# Patient Record
Sex: MALE | Race: WHITE | NOT HISPANIC OR LATINO | Employment: OTHER | URBAN - METROPOLITAN AREA
[De-identification: names, ages, dates, MRNs, and addresses within clinical notes are randomized per-mention and may not be internally consistent; named-entity substitution may affect disease eponyms.]

---

## 2023-07-19 LAB
6-ACETYLMORPHINE: <25 NG/ML
7-AMINOCLONAZEPAM: <25 NG/ML
ALPHA-HYDROXYALPRAZOLAM: <25 NG/ML
ALPHA-HYDROXYMIDAZOLAM: <25 NG/ML
ALPRAZOLAM: <25 NG/ML
AMPHETAMINE (PRESENCE) IN URINE BY SCREEN METHOD: ABNORMAL
BARBITURATES PRESENCE IN URINE BY SCREEN METHOD: ABNORMAL
CANNABINOIDS IN URINE BY SCREEN METHOD: ABNORMAL
CHLORDIAZEPOXIDE: <25 NG/ML
CLONAZEPAM: <25 NG/ML
COCAINE (PRESENCE) IN URINE BY SCREEN METHOD: ABNORMAL
CODEINE: <50 NG/ML
CREATINE, URINE FOR DRUG: 87.3 MG/DL
DIAZEPAM: <25 NG/ML
DRUG SCREEN COMMENT URINE: ABNORMAL
EDDP: <25 NG/ML
FENTANYL CONFIRMATION, URINE: <2.5 NG/ML
HYDROCODONE: 1079 NG/ML
HYDROMORPHONE: 584 NG/ML
LORAZEPAM: <25 NG/ML
METHADONE CONFIRMATION,URINE: <25 NG/ML
MIDAZOLAM: <25 NG/ML
MORPHINE URINE: <50 NG/ML
NORDIAZEPAM: <25 NG/ML
NORFENTANYL: <2.5 NG/ML
NORHYDROCODONE: >1000 NG/ML
NOROXYCODONE: <25 NG/ML
O-DESMETHYLTRAMADOL: <50 NG/ML
OXAZEPAM: <25 NG/ML
OXYCODONE: <25 NG/ML
OXYMORPHONE: <25 NG/ML
PHENCYCLIDINE (PRESENCE) IN URINE BY SCREEN METHOD: ABNORMAL
TEMAZEPAM: <25 NG/ML
TRAMADOL: <50 NG/ML
ZOLPIDEM METABOLITE (ZCA): <25 NG/ML
ZOLPIDEM: <25 NG/ML

## 2023-09-01 PROBLEM — D3A.098: Status: ACTIVE | Noted: 2023-09-01

## 2023-09-01 PROBLEM — R63.4 WEIGHT LOSS: Status: ACTIVE | Noted: 2023-09-01

## 2023-09-01 PROBLEM — R10.12 LEFT UPPER QUADRANT PAIN: Status: ACTIVE | Noted: 2023-09-01

## 2023-09-01 PROBLEM — R55 NEAR SYNCOPE: Status: ACTIVE | Noted: 2023-09-01

## 2023-09-01 PROBLEM — G89.29 CHRONIC RIGHT-SIDED LOW BACK PAIN WITHOUT SCIATICA: Status: ACTIVE | Noted: 2023-09-01

## 2023-09-01 PROBLEM — I48.91 AFIB (MULTI): Status: ACTIVE | Noted: 2023-09-01

## 2023-09-01 PROBLEM — E66.3 OVERWEIGHT (BMI 25.0-29.9): Status: ACTIVE | Noted: 2023-09-01

## 2023-09-01 PROBLEM — R42 DIZZY SPELLS: Status: ACTIVE | Noted: 2023-09-01

## 2023-09-01 PROBLEM — M79.18 RIGHT BUTTOCK PAIN: Status: ACTIVE | Noted: 2023-09-01

## 2023-09-01 PROBLEM — E78.5 HYPERLIPIDEMIA: Status: ACTIVE | Noted: 2023-09-01

## 2023-09-01 PROBLEM — R51.9 GENERALIZED HEADACHES: Status: ACTIVE | Noted: 2023-09-01

## 2023-09-01 PROBLEM — M54.50 CHRONIC RIGHT-SIDED LOW BACK PAIN WITHOUT SCIATICA: Status: ACTIVE | Noted: 2023-09-01

## 2023-09-01 PROBLEM — M70.61 GREATER TROCHANTERIC BURSITIS OF RIGHT HIP: Status: ACTIVE | Noted: 2023-09-01

## 2023-09-01 PROBLEM — R00.1 SYMPTOMATIC BRADYCARDIA: Status: ACTIVE | Noted: 2023-09-01

## 2023-09-01 PROBLEM — R13.10 DIFFICULTY SWALLOWING: Status: ACTIVE | Noted: 2023-09-01

## 2023-09-01 RX ORDER — HYDROCODONE BITARTRATE AND ACETAMINOPHEN 7.5; 325 MG/1; MG/1
1 TABLET ORAL EVERY 8 HOURS PRN
COMMUNITY
Start: 2022-07-14 | End: 2023-10-09 | Stop reason: SDUPTHER

## 2023-09-01 RX ORDER — OXYCODONE AND ACETAMINOPHEN 5; 325 MG/1; MG/1
1 TABLET ORAL EVERY 4 HOURS
COMMUNITY

## 2023-09-01 RX ORDER — AMLODIPINE BESYLATE 5 MG/1
TABLET ORAL
COMMUNITY
Start: 2020-10-21

## 2023-09-01 RX ORDER — AZELAIC ACID 0.15 G/G
1 GEL TOPICAL 2 TIMES DAILY
COMMUNITY
Start: 2021-06-11

## 2023-09-01 RX ORDER — FLUTICASONE PROPIONATE 50 MCG
2 SPRAY, SUSPENSION (ML) NASAL DAILY
COMMUNITY
Start: 2017-02-18

## 2023-09-01 RX ORDER — SILDENAFIL 100 MG/1
100 TABLET, FILM COATED ORAL AS NEEDED
COMMUNITY

## 2023-09-01 RX ORDER — POTASSIUM CITRATE 10 MEQ/1
TABLET, EXTENDED RELEASE ORAL
COMMUNITY
Start: 2016-06-10

## 2023-09-01 RX ORDER — MV-MIN/VIT C/GLUT/LYSINE/HB124 1000-50 MG
TABLET, EFFERVESCENT ORAL
COMMUNITY

## 2023-09-01 RX ORDER — LIDOCAINE 50 MG/G
1 PATCH TOPICAL DAILY
COMMUNITY
Start: 2020-07-22 | End: 2023-12-07

## 2023-09-01 RX ORDER — NALOXONE HYDROCHLORIDE 4 MG/.1ML
4 SPRAY NASAL AS NEEDED
COMMUNITY
Start: 2020-12-16

## 2023-09-01 RX ORDER — CLOTRIMAZOLE 1 %
CREAM (GRAM) TOPICAL
COMMUNITY
Start: 2021-06-11

## 2023-09-01 RX ORDER — AMMONIUM LACTATE 12 G/100G
LOTION TOPICAL
COMMUNITY
Start: 2017-09-08

## 2023-10-09 ENCOUNTER — OFFICE VISIT (OUTPATIENT)
Dept: PAIN MEDICINE | Facility: CLINIC | Age: 68
End: 2023-10-09
Payer: COMMERCIAL

## 2023-10-09 VITALS
DIASTOLIC BLOOD PRESSURE: 84 MMHG | SYSTOLIC BLOOD PRESSURE: 155 MMHG | WEIGHT: 215 LBS | BODY MASS INDEX: 32.58 KG/M2 | HEART RATE: 62 BPM | HEIGHT: 68 IN | TEMPERATURE: 97 F

## 2023-10-09 DIAGNOSIS — M54.50 CHRONIC RIGHT-SIDED LOW BACK PAIN WITHOUT SCIATICA: ICD-10-CM

## 2023-10-09 DIAGNOSIS — D3A.098 CARCINOID TUMOR OF ABDOMEN (CMS-HCC): Primary | ICD-10-CM

## 2023-10-09 DIAGNOSIS — G89.29 CHRONIC RIGHT-SIDED LOW BACK PAIN WITHOUT SCIATICA: ICD-10-CM

## 2023-10-09 PROCEDURE — 1159F MED LIST DOCD IN RCRD: CPT | Performed by: ANESTHESIOLOGY

## 2023-10-09 PROCEDURE — 99213 OFFICE O/P EST LOW 20 MIN: CPT | Performed by: ANESTHESIOLOGY

## 2023-10-09 PROCEDURE — 1160F RVW MEDS BY RX/DR IN RCRD: CPT | Performed by: ANESTHESIOLOGY

## 2023-10-09 PROCEDURE — 1125F AMNT PAIN NOTED PAIN PRSNT: CPT | Performed by: ANESTHESIOLOGY

## 2023-10-09 RX ORDER — HYDROCODONE BITARTRATE AND ACETAMINOPHEN 7.5; 325 MG/1; MG/1
1 TABLET ORAL EVERY 8 HOURS PRN
Qty: 90 TABLET | Refills: 0 | Status: SHIPPED
Start: 2023-10-13 | End: 2023-10-11 | Stop reason: SDUPTHER

## 2023-10-09 ASSESSMENT — ENCOUNTER SYMPTOMS
BACK PAIN: 1
LOSS OF SENSATION IN FEET: 0
DEPRESSION: 0
SHORTNESS OF BREATH: 0
OCCASIONAL FEELINGS OF UNSTEADINESS: 0
FEVER: 0

## 2023-10-09 ASSESSMENT — PAIN - FUNCTIONAL ASSESSMENT: PAIN_FUNCTIONAL_ASSESSMENT: 0-10

## 2023-10-09 ASSESSMENT — PAIN SCALES - GENERAL
PAINLEVEL: 9
PAINLEVEL_OUTOF10: 9

## 2023-10-09 ASSESSMENT — COLUMBIA-SUICIDE SEVERITY RATING SCALE - C-SSRS
6. HAVE YOU EVER DONE ANYTHING, STARTED TO DO ANYTHING, OR PREPARED TO DO ANYTHING TO END YOUR LIFE?: NO
1. IN THE PAST MONTH, HAVE YOU WISHED YOU WERE DEAD OR WISHED YOU COULD GO TO SLEEP AND NOT WAKE UP?: NO

## 2023-10-09 ASSESSMENT — PATIENT HEALTH QUESTIONNAIRE - PHQ9
2. FEELING DOWN, DEPRESSED OR HOPELESS: NOT AT ALL
SUM OF ALL RESPONSES TO PHQ9 QUESTIONS 1 AND 2: 0
1. LITTLE INTEREST OR PLEASURE IN DOING THINGS: NOT AT ALL

## 2023-10-09 ASSESSMENT — LIFESTYLE VARIABLES: TOTAL SCORE: 0

## 2023-10-09 ASSESSMENT — PAIN DESCRIPTION - DESCRIPTORS: DESCRIPTORS: ACHING

## 2023-10-09 NOTE — PROGRESS NOTES
Chief Complain  Chronic low back pain worse on the right side    History Of Present Illness  Indra Zimmerman is a 68 y.o. male here for right-sided chronic low back pain radiating to buttocks. The patient rates the pain at 9 on a scale from 0-10.  The patient describes pain as aching.  The pain is worsened by walking and is alleviated by medications opioid analgesics.  Since the last visit the pain has  been stable .  The patient denies any fever, chills, weight loss, bladder/bowel incontinence.       Past Medical History  He has a past medical history of Hordeolum externum unspecified eye, unspecified eyelid, Male erectile dysfunction, unspecified, Other conditions influencing health status, Personal history of malignant neoplasm of liver, Personal history of other diseases of the digestive system, Personal history of other endocrine, nutritional and metabolic disease, Personal history of other infectious and parasitic diseases, Personal history of other malignant neoplasm of kidney, Personal history of other mental and behavioral disorders, Personal history of other specified conditions, and Unspecified visual disturbance.    Surgical History  He has a past surgical history that includes Other surgical history (12/03/2014); Other surgical history (12/03/2014); and Other surgical history (08/03/2020).     Social History  He has no history on file for tobacco use, alcohol use, and drug use.    Family History  Family History   Problem Relation Name Age of Onset    Diabetes Mother      Other (cardiac disorder) Father      Diabetes Father      Diabetes Sister      Diabetes Brother          Allergies  Penicillins    Review of Systems  Review of Systems   Constitutional:  Negative for fever.   Respiratory:  Negative for shortness of breath.    Cardiovascular:  Negative for chest pain.   Musculoskeletal:  Positive for back pain.   Psychiatric/Behavioral:  Negative for suicidal ideas.         Physical Exam  Physical Exam  HENT:  "     Head: Normocephalic.   Eyes:      Pupils: Pupils are equal, round, and reactive to light.   Pulmonary:      Effort: Pulmonary effort is normal.   Neurological:      Mental Status: He is alert and oriented to person, place, and time.   Psychiatric:         Mood and Affect: Mood normal.           Last Recorded Vitals  Blood pressure 155/84, pulse 62, temperature 36.1 °C (97 °F), height 1.727 m (5' 8\"), weight 97.5 kg (215 lb).         Assessment/Plan     Indra Zimmerman is a 68 y.o. male here for follow-up of chronic low back pain.  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  These findings are likely is possible for his pain.  Currently managing his pain with chronic opiate therapy taking hydrocodone 7.5 mg every 8 hours as needed. He is getting 50-60% pain relief. Gives energy to do house hold chores and go for a walk.  Denies any significant side effects.  He does report development of some tolerance to hydrocodone, its been less effective than before.  Currently being treated at [facility] for his cancer with monthly octreotide infusions.  I discussed option of opioid rotation and switching him to oxycodone 5 mg every 8 hours as needed for couple of days and see if he get better results with that.  He would consider it and let us know if he decides to do the trial with oxycodone.  Refill for hydrocodone was provided to the patient.  I have personally reviewed the OARRS report. This report is scanned into the electronic medical record. I have considered the risks of abuse, dependence, addiction and diversion.         Homar Trotter MD  "

## 2023-10-11 DIAGNOSIS — D3A.098 CARCINOID TUMOR OF ABDOMEN (CMS-HCC): ICD-10-CM

## 2023-10-11 DIAGNOSIS — G89.29 CHRONIC RIGHT-SIDED LOW BACK PAIN WITHOUT SCIATICA: ICD-10-CM

## 2023-10-11 DIAGNOSIS — M54.50 CHRONIC RIGHT-SIDED LOW BACK PAIN WITHOUT SCIATICA: ICD-10-CM

## 2023-10-11 NOTE — TELEPHONE ENCOUNTER
PT CALLED AND STATED THAT [facility] IS OUT OF STOCK FOR VICODIN. WOULD LIKE TO BE RESENT TO  [facility]. UPDATED IN CHART

## 2023-10-12 RX ORDER — HYDROCODONE BITARTRATE AND ACETAMINOPHEN 7.5; 325 MG/1; MG/1
1 TABLET ORAL EVERY 8 HOURS PRN
Qty: 90 TABLET | Refills: 0 | Status: SHIPPED | OUTPATIENT
Start: 2023-10-13 | End: 2023-11-10 | Stop reason: SDUPTHER

## 2023-10-13 ENCOUNTER — PHARMACY VISIT (OUTPATIENT)
Dept: PHARMACY | Facility: CLINIC | Age: 68
End: 2023-10-13
Payer: MEDICARE

## 2023-10-13 PROCEDURE — RXMED WILLOW AMBULATORY MEDICATION CHARGE

## 2023-11-09 DIAGNOSIS — D3A.098 CARCINOID TUMOR OF ABDOMEN (CMS-HCC): ICD-10-CM

## 2023-11-09 DIAGNOSIS — M54.50 CHRONIC RIGHT-SIDED LOW BACK PAIN WITHOUT SCIATICA: ICD-10-CM

## 2023-11-09 DIAGNOSIS — G89.29 CHRONIC RIGHT-SIDED LOW BACK PAIN WITHOUT SCIATICA: ICD-10-CM

## 2023-11-09 RX ORDER — OXYCODONE AND ACETAMINOPHEN 5; 325 MG/1; MG/1
1 TABLET ORAL EVERY 8 HOURS PRN
Qty: 5 TABLET | Status: CANCELLED | OUTPATIENT
Start: 2023-11-09

## 2023-11-10 RX ORDER — HYDROCODONE BITARTRATE AND ACETAMINOPHEN 7.5; 325 MG/1; MG/1
1 TABLET ORAL EVERY 8 HOURS PRN
Qty: 90 TABLET | Refills: 0 | Status: SHIPPED | OUTPATIENT
Start: 2023-11-12 | End: 2023-12-11 | Stop reason: SDUPTHER

## 2023-12-06 DIAGNOSIS — D3A.098: ICD-10-CM

## 2023-12-07 RX ORDER — LIDOCAINE 50 MG/G
1 PATCH TOPICAL EVERY 12 HOURS
Qty: 30 PATCH | Refills: 0 | Status: SHIPPED | OUTPATIENT
Start: 2023-12-07

## 2023-12-11 DIAGNOSIS — G89.29 CHRONIC RIGHT-SIDED LOW BACK PAIN WITHOUT SCIATICA: ICD-10-CM

## 2023-12-11 DIAGNOSIS — M54.50 CHRONIC RIGHT-SIDED LOW BACK PAIN WITHOUT SCIATICA: ICD-10-CM

## 2023-12-11 DIAGNOSIS — D3A.098 CARCINOID TUMOR OF ABDOMEN (CMS-HCC): ICD-10-CM

## 2023-12-12 RX ORDER — HYDROCODONE BITARTRATE AND ACETAMINOPHEN 7.5; 325 MG/1; MG/1
1 TABLET ORAL EVERY 8 HOURS PRN
Qty: 90 TABLET | Refills: 0 | Status: SHIPPED | OUTPATIENT
Start: 2023-12-12 | End: 2024-01-09 | Stop reason: SDUPTHER

## 2023-12-28 PROBLEM — R39.15 URGENCY OF URINATION: Status: ACTIVE | Noted: 2022-04-12

## 2023-12-28 PROBLEM — R68.83 CHILLS: Status: ACTIVE | Noted: 2021-09-23

## 2023-12-28 PROBLEM — M25.674 DECREASED ROM OF RIGHT FOOT: Status: ACTIVE | Noted: 2019-12-04

## 2023-12-28 PROBLEM — N50.819 ORCHALGIA: Status: ACTIVE | Noted: 2018-03-20

## 2023-12-28 PROBLEM — N40.1 BPH WITH OBSTRUCTION/LOWER URINARY TRACT SYMPTOMS: Status: ACTIVE | Noted: 2017-09-21

## 2023-12-28 PROBLEM — R82.90 ABNORMAL URINALYSIS: Status: ACTIVE | Noted: 2021-09-15

## 2023-12-28 PROBLEM — K21.9 GERD (GASTROESOPHAGEAL REFLUX DISEASE): Status: ACTIVE | Noted: 2023-12-28

## 2023-12-28 PROBLEM — M62.830 LUMBAR PARASPINAL MUSCLE SPASM: Status: ACTIVE | Noted: 2020-07-22

## 2023-12-28 PROBLEM — Z85.9 HISTORY OF MALIGNANT NEOPLASM: Status: ACTIVE | Noted: 2021-09-23

## 2023-12-28 PROBLEM — Z86.39 HISTORY OF OBESITY: Status: ACTIVE | Noted: 2021-09-23

## 2023-12-28 PROBLEM — F17.200 SMOKER: Status: ACTIVE | Noted: 2023-12-28

## 2023-12-28 PROBLEM — R53.83 FATIGUE: Status: ACTIVE | Noted: 2020-10-21

## 2023-12-28 PROBLEM — R10.31 RIGHT GROIN PAIN: Status: ACTIVE | Noted: 2017-08-22

## 2023-12-28 PROBLEM — S86.011A ACHILLES RUPTURE, RIGHT: Status: ACTIVE | Noted: 2019-09-21

## 2023-12-28 PROBLEM — N52.9 ERECTILE DYSFUNCTION: Status: ACTIVE | Noted: 2021-09-23

## 2023-12-28 PROBLEM — K40.90 LEFT INGUINAL HERNIA: Status: ACTIVE | Noted: 2018-03-20

## 2023-12-28 PROBLEM — E44.1 MALNUTRITION OF MILD DEGREE (MULTI): Status: ACTIVE | Noted: 2018-06-08

## 2023-12-28 PROBLEM — T88.4XXA DIFFICULT AIRWAY: Status: ACTIVE | Noted: 2018-05-04

## 2023-12-28 PROBLEM — G89.29 CHRONIC LOW BACK PAIN: Status: ACTIVE | Noted: 2020-10-21

## 2023-12-28 PROBLEM — M25.571 ACUTE RIGHT ANKLE PAIN: Status: ACTIVE | Noted: 2019-12-04

## 2023-12-28 PROBLEM — R26.9 ABNORMALITY OF GAIT: Status: ACTIVE | Noted: 2019-12-04

## 2023-12-28 PROBLEM — M54.2 NECK PAIN: Status: ACTIVE | Noted: 2018-02-06

## 2023-12-28 PROBLEM — R34 LOW URINE OUTPUT: Status: ACTIVE | Noted: 2018-07-26

## 2023-12-28 PROBLEM — M46.1 SACROILIITIS (CMS-HCC): Status: ACTIVE | Noted: 2020-07-22

## 2023-12-28 PROBLEM — M54.50 CHRONIC LOW BACK PAIN: Status: ACTIVE | Noted: 2020-10-21

## 2023-12-28 PROBLEM — E66.9 OBESITY, CLASS I, BMI 30-34.9: Status: ACTIVE | Noted: 2018-12-04

## 2023-12-28 PROBLEM — N13.8 BPH WITH OBSTRUCTION/LOWER URINARY TRACT SYMPTOMS: Status: ACTIVE | Noted: 2017-09-21

## 2023-12-28 PROBLEM — E66.811 OBESITY, CLASS I, BMI 30-34.9: Status: ACTIVE | Noted: 2018-12-04

## 2023-12-28 PROBLEM — R09.81 CONGESTION OF PARANASAL SINUS: Status: ACTIVE | Noted: 2021-09-23

## 2023-12-28 PROBLEM — R97.20 ELEVATED PROSTATE SPECIFIC ANTIGEN (PSA): Status: ACTIVE | Noted: 2017-10-05

## 2023-12-28 PROBLEM — R55 PRE-SYNCOPE: Status: ACTIVE | Noted: 2021-09-23

## 2023-12-28 PROBLEM — N21.0 BLADDER CALCULI: Status: ACTIVE | Noted: 2021-09-15

## 2023-12-28 PROBLEM — R82.992 HYPEROXALURIA: Status: ACTIVE | Noted: 2018-07-26

## 2023-12-28 PROBLEM — Z86.59 HISTORY OF DEPRESSION: Status: ACTIVE | Noted: 2021-09-23

## 2023-12-28 PROBLEM — H00.019 HORDEOLUM EXTERNUM: Status: ACTIVE | Noted: 2021-09-23

## 2023-12-28 PROBLEM — R09.82 POSTNASAL DRIP: Status: ACTIVE | Noted: 2021-09-23

## 2023-12-28 PROBLEM — R29.898 DECREASED STRENGTH OF LOWER EXTREMITY: Status: ACTIVE | Noted: 2019-12-04

## 2023-12-28 PROBLEM — H00.014 HORDEOLUM EXTERNUM OF LEFT UPPER EYELID: Status: ACTIVE | Noted: 2019-06-17

## 2023-12-28 RX ORDER — LOSARTAN POTASSIUM 25 MG/1
TABLET ORAL
COMMUNITY

## 2023-12-28 RX ORDER — TAMSULOSIN HYDROCHLORIDE 0.4 MG/1
CAPSULE ORAL
COMMUNITY
Start: 2023-11-27

## 2023-12-28 RX ORDER — COVID-19 ANTIGEN TEST
KIT MISCELLANEOUS
COMMUNITY
Start: 2023-04-17

## 2023-12-28 RX ORDER — KETOCONAZOLE 20 MG/ML
SHAMPOO, SUSPENSION TOPICAL
COMMUNITY
Start: 2023-12-06

## 2023-12-28 RX ORDER — POLYETHYLENE GLYCOL-3350 AND ELECTROLYTES 236; 6.74; 5.86; 2.97; 22.74 G/274.31G; G/274.31G; G/274.31G; G/274.31G; G/274.31G
POWDER, FOR SOLUTION ORAL
COMMUNITY
Start: 2023-03-17

## 2023-12-28 RX ORDER — GENTAMICIN SULFATE 1 MG/G
OINTMENT TOPICAL 2 TIMES DAILY
COMMUNITY
Start: 2023-11-16

## 2023-12-28 RX ORDER — KETOROLAC TROMETHAMINE 10 MG/1
1 TABLET, FILM COATED ORAL EVERY 6 HOURS PRN
COMMUNITY
Start: 2023-11-27

## 2023-12-28 RX ORDER — TROSPIUM CHLORIDE 20 MG/1
20 TABLET, FILM COATED ORAL 2 TIMES DAILY
COMMUNITY

## 2023-12-28 RX ORDER — TADALAFIL 20 MG/1
TABLET ORAL
COMMUNITY
Start: 2023-08-22

## 2024-01-09 ENCOUNTER — OFFICE VISIT (OUTPATIENT)
Dept: PAIN MEDICINE | Facility: CLINIC | Age: 69
End: 2024-01-09
Payer: COMMERCIAL

## 2024-01-09 VITALS
HEIGHT: 69 IN | TEMPERATURE: 95.9 F | BODY MASS INDEX: 31.84 KG/M2 | DIASTOLIC BLOOD PRESSURE: 90 MMHG | SYSTOLIC BLOOD PRESSURE: 163 MMHG | HEART RATE: 85 BPM | RESPIRATION RATE: 20 BRPM | WEIGHT: 215 LBS

## 2024-01-09 DIAGNOSIS — D3A.098 CARCINOID TUMOR OF ABDOMEN (CMS-HCC): ICD-10-CM

## 2024-01-09 DIAGNOSIS — C79.51 METASTATIC CANCER TO SPINE (MULTI): Primary | ICD-10-CM

## 2024-01-09 DIAGNOSIS — G89.29 CHRONIC RIGHT-SIDED LOW BACK PAIN WITHOUT SCIATICA: ICD-10-CM

## 2024-01-09 DIAGNOSIS — M54.50 CHRONIC RIGHT-SIDED LOW BACK PAIN WITHOUT SCIATICA: ICD-10-CM

## 2024-01-09 PROCEDURE — 1160F RVW MEDS BY RX/DR IN RCRD: CPT | Performed by: ANESTHESIOLOGY

## 2024-01-09 PROCEDURE — 1159F MED LIST DOCD IN RCRD: CPT | Performed by: ANESTHESIOLOGY

## 2024-01-09 PROCEDURE — 1125F AMNT PAIN NOTED PAIN PRSNT: CPT | Performed by: ANESTHESIOLOGY

## 2024-01-09 PROCEDURE — 99213 OFFICE O/P EST LOW 20 MIN: CPT | Performed by: ANESTHESIOLOGY

## 2024-01-09 RX ORDER — HYDROCODONE BITARTRATE AND ACETAMINOPHEN 7.5; 325 MG/1; MG/1
1 TABLET ORAL EVERY 8 HOURS PRN
Qty: 90 TABLET | Refills: 0 | Status: SHIPPED | OUTPATIENT
Start: 2024-01-11 | End: 2024-02-10

## 2024-01-09 ASSESSMENT — PAIN DESCRIPTION - DESCRIPTORS: DESCRIPTORS: ACHING;BURNING;CRAMPING

## 2024-01-09 ASSESSMENT — ENCOUNTER SYMPTOMS
BACK PAIN: 1
FEVER: 0
SHORTNESS OF BREATH: 0

## 2024-01-09 ASSESSMENT — PAIN - FUNCTIONAL ASSESSMENT: PAIN_FUNCTIONAL_ASSESSMENT: 0-10

## 2024-01-09 ASSESSMENT — PAIN SCALES - GENERAL: PAINLEVEL_OUTOF10: 9

## 2024-01-09 NOTE — PROGRESS NOTES
Chief Complain  Med Management and Follow-up (For pain in back, stomach and right hip. Currently take norco and it does help with pain and uses lidocaine patches as needed. Pain goes to a 9 and medication takes it down to 5-6)       History Of Present Illness  Indra Zimmerman is a 68 y.o. male here for right sided low back pain. The patient rates the pain at 8.5  on a scale from 0-10.  The patient describes pain as sharp.  The pain is worsened by no aggravating factors identified and is alleviated by medications prescribed pain medications.  Since the last visit the pain has worsened.  The patient denies any fever, chills, weight loss, bladder/bowel incontinence.     Kidney stones: 11/27  Skin cancer removed    Past Medical History  He has a past medical history of Hordeolum externum unspecified eye, unspecified eyelid, Male erectile dysfunction, unspecified, Other conditions influencing health status, Personal history of malignant neoplasm of liver, Personal history of other diseases of the digestive system, Personal history of other endocrine, nutritional and metabolic disease, Personal history of other infectious and parasitic diseases, Personal history of other malignant neoplasm of kidney, Personal history of other mental and behavioral disorders, Personal history of other specified conditions, and Unspecified visual disturbance.    Surgical History  He has a past surgical history that includes Other surgical history (12/03/2014); Other surgical history (12/03/2014); and Other surgical history (08/03/2020).     Social History  He reports that he has been smoking cigarettes and cigars. He uses smokeless tobacco. No history on file for alcohol use and drug use.    Family History  Family History   Problem Relation Name Age of Onset    Diabetes Mother      Other (cardiac disorder) Father      Diabetes Father      Diabetes Sister      Diabetes Brother          Allergies  Penicillins    Review of Systems  Review of  "Systems   Constitutional:  Negative for fever.   Respiratory:  Negative for shortness of breath.    Cardiovascular:  Negative for chest pain.   Musculoskeletal:  Positive for back pain.   Psychiatric/Behavioral:  Negative for suicidal ideas.         Physical Exam  Physical Exam  HENT:      Head: Normocephalic and atraumatic.   Eyes:      Extraocular Movements: Extraocular movements intact.   Pulmonary:      Effort: Pulmonary effort is normal.   Musculoskeletal:      Cervical back: Neck supple.   Neurological:      Mental Status: He is alert.   Psychiatric:         Mood and Affect: Mood normal.           Last Recorded Vitals  Blood pressure 163/90, pulse 85, temperature 35.5 °C (95.9 °F), temperature source Tympanic, resp. rate 20, height 1.753 m (5' 9\"), weight 97.5 kg (215 lb).       Assessment/Plan     Indra Zimmerman is a 68 y.o. male here for follow-up of chronic low back pain worse on the right side.  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  He recently had an MRI which did reveal some potential new lesions in his lumbar vertebrae.  He does have a appointment with this oncologist regarding the findings of his MRI.  Does report some increasing pain in his low back area it is unclear to me at this time whether it is due to the worsening disease or due to the tolerance.  At any rate he still reports more than 50% improvement in his pain with his current regimen of hydrocodone 7.5 mg every 8 hours as needed.  Helping significantly with his quality of life, helping with household chores and self-care.  He denies any significant side effects.  An updated opioid consent was signed by patient today.  Refill for hydrocodone was provided. I have personally reviewed the OARRS report.  I have considered the risks of abuse, dependence, addiction and diversion.      Homar Trotter MD  "

## 2024-02-07 ENCOUNTER — TELEPHONE (OUTPATIENT)
Dept: PAIN MEDICINE | Facility: CLINIC | Age: 69
End: 2024-02-07
Payer: COMMERCIAL

## 2024-02-09 DIAGNOSIS — D3A.098 CARCINOID TUMOR OF ABDOMEN (CMS-HCC): ICD-10-CM

## 2024-02-09 DIAGNOSIS — G89.29 CHRONIC RIGHT-SIDED LOW BACK PAIN WITHOUT SCIATICA: ICD-10-CM

## 2024-02-09 DIAGNOSIS — M54.50 CHRONIC RIGHT-SIDED LOW BACK PAIN WITHOUT SCIATICA: ICD-10-CM

## 2024-02-15 RX ORDER — HYDROCODONE BITARTRATE AND ACETAMINOPHEN 7.5; 325 MG/1; MG/1
1 TABLET ORAL EVERY 8 HOURS PRN
Qty: 90 TABLET | Refills: 0 | OUTPATIENT
Start: 2024-02-15 | End: 2024-03-16

## 2024-04-09 ENCOUNTER — OFFICE VISIT (OUTPATIENT)
Dept: PAIN MEDICINE | Facility: CLINIC | Age: 69
End: 2024-04-09
Payer: COMMERCIAL

## 2024-04-09 VITALS
TEMPERATURE: 97 F | HEART RATE: 62 BPM | RESPIRATION RATE: 18 BRPM | BODY MASS INDEX: 28.14 KG/M2 | WEIGHT: 190 LBS | SYSTOLIC BLOOD PRESSURE: 122 MMHG | HEIGHT: 69 IN | DIASTOLIC BLOOD PRESSURE: 63 MMHG | OXYGEN SATURATION: 96 %

## 2024-04-09 DIAGNOSIS — C7B.02: Primary | ICD-10-CM

## 2024-04-09 PROCEDURE — 1125F AMNT PAIN NOTED PAIN PRSNT: CPT | Performed by: ANESTHESIOLOGY

## 2024-04-09 PROCEDURE — 1160F RVW MEDS BY RX/DR IN RCRD: CPT | Performed by: ANESTHESIOLOGY

## 2024-04-09 PROCEDURE — 99212 OFFICE O/P EST SF 10 MIN: CPT | Performed by: ANESTHESIOLOGY

## 2024-04-09 PROCEDURE — 1159F MED LIST DOCD IN RCRD: CPT | Performed by: ANESTHESIOLOGY

## 2024-04-09 RX ORDER — HYDROMORPHONE HYDROCHLORIDE 4 MG/1
4 TABLET ORAL EVERY 4 HOURS PRN
COMMUNITY

## 2024-04-09 SDOH — ECONOMIC STABILITY: FOOD INSECURITY: WITHIN THE PAST 12 MONTHS, THE FOOD YOU BOUGHT JUST DIDN'T LAST AND YOU DIDN'T HAVE MONEY TO GET MORE.: NEVER TRUE

## 2024-04-09 SDOH — ECONOMIC STABILITY: FOOD INSECURITY: WITHIN THE PAST 12 MONTHS, YOU WORRIED THAT YOUR FOOD WOULD RUN OUT BEFORE YOU GOT MONEY TO BUY MORE.: NEVER TRUE

## 2024-04-09 ASSESSMENT — PAIN SCALES - GENERAL
PAINLEVEL: 8
PAINLEVEL_OUTOF10: 8

## 2024-04-09 ASSESSMENT — ENCOUNTER SYMPTOMS
SHORTNESS OF BREATH: 0
OCCASIONAL FEELINGS OF UNSTEADINESS: 0
LOSS OF SENSATION IN FEET: 0
BLOOD IN STOOL: 0
DEPRESSION: 0
UNEXPECTED WEIGHT CHANGE: 1
FATIGUE: 1

## 2024-04-09 ASSESSMENT — PAIN DESCRIPTION - DESCRIPTORS: DESCRIPTORS: OTHER (COMMENT)

## 2024-04-09 ASSESSMENT — LIFESTYLE VARIABLES
HOW OFTEN DO YOU HAVE SIX OR MORE DRINKS ON ONE OCCASION: NEVER
AUDIT-C TOTAL SCORE: 0
HOW OFTEN DO YOU HAVE A DRINK CONTAINING ALCOHOL: NEVER
SKIP TO QUESTIONS 9-10: 1
HOW MANY STANDARD DRINKS CONTAINING ALCOHOL DO YOU HAVE ON A TYPICAL DAY: PATIENT DOES NOT DRINK

## 2024-04-09 ASSESSMENT — PAIN - FUNCTIONAL ASSESSMENT: PAIN_FUNCTIONAL_ASSESSMENT: 0-10

## 2024-04-09 NOTE — PROGRESS NOTES
Chief Complain  Pain       History Of Present Illness  Indra Zimmerman is a 68 y.o. male here for stomach and low back pain . The patient rates the pain at 8  on a scale from 0-10.  The patient describes pain as throbbing.  The pain is worsened by no aggravating factors identified and is alleviated by medications opioid analgesics.  Since the last visit the pain has worsened.  The patient denies any fever, chills, weight loss, bladder/bowel incontinence.       Past Medical History  He has a past medical history of Hordeolum externum unspecified eye, unspecified eyelid, Male erectile dysfunction, unspecified, Other conditions influencing health status, Personal history of malignant neoplasm of liver, Personal history of other diseases of the digestive system, Personal history of other endocrine, nutritional and metabolic disease, Personal history of other infectious and parasitic diseases, Personal history of other malignant neoplasm of kidney, Personal history of other mental and behavioral disorders, Personal history of other specified conditions, and Unspecified visual disturbance.    Surgical History  He has a past surgical history that includes Other surgical history (12/03/2014); Other surgical history (12/03/2014); and Other surgical history (08/03/2020).     Social History  He reports that he has quit smoking. His smoking use included cigarettes and cigars. He uses smokeless tobacco. He reports that he does not currently use alcohol. He reports that he does not currently use drugs.    Family History  Family History   Problem Relation Name Age of Onset    Diabetes Mother      Other (cardiac disorder) Father      Diabetes Father      Diabetes Sister      Diabetes Brother          Allergies  Penicillins    Review of Systems  Review of Systems   Constitutional:  Positive for fatigue and unexpected weight change.   Respiratory:  Negative for shortness of breath.    Cardiovascular:  Negative for chest pain.  "  Gastrointestinal:  Negative for blood in stool.   Genitourinary:         -ve for bladder or bowel incontinence    Psychiatric/Behavioral:  Negative for suicidal ideas.         Physical Exam  Physical Exam  HENT:      Head: Normocephalic and atraumatic.   Eyes:      Extraocular Movements: Extraocular movements intact.   Pulmonary:      Effort: Pulmonary effort is normal.   Musculoskeletal:      Cervical back: Neck supple.   Neurological:      Mental Status: He is alert.   Psychiatric:         Mood and Affect: Mood normal.           Last Recorded Vitals  Blood pressure 122/63, pulse 62, temperature 36.1 °C (97 °F), resp. rate 18, height 1.753 m (5' 9\"), weight 86.2 kg (190 lb), SpO2 96 %.       Assessment/Plan     Indra Zimmerman is a 68 y.o. male here for follow-up of chronic abdominal, low back pain.  His pain is likely from metastatic carcinoid tumor and has metastasis to liver and spine.  Recently done imaging does show increasing size of those metastatic lesions.  He being treated at [facility] by oncology and palliative medicine.  Undergoing chemotherapy and may undergo radiation therapy.  We were treating him with hydrocodone 7.5 mg 3 times daily.  Which was getting less effective due to the spread of his tumor.  Currently under care of palliative medicine taking oxycodone 10 mg.  At this point I do not see my role in managing his pain.  He is to continue follow-up with oncology and palliative medicine.  He is cleared of cancer in the future would be happy to see him back for any remaining pain issues.        Total time spent caring for the patient today was 15 minutes. This includes time spent before the visit reviewing the chart, time spent during the visit, and time spent after the visit on documentation.      Homar Trotter MD    "

## 2024-04-09 NOTE — PROGRESS NOTES
Pt is complaining of pain in his chest, stomach, and back. Pt has cancer that has spread. Pt states his pain level is an 8/10 on the pain scale.

## 2024-09-11 DIAGNOSIS — D3A.098: ICD-10-CM

## 2024-09-12 RX ORDER — LIDOCAINE 50 MG/G
1 PATCH TOPICAL EVERY 12 HOURS
Qty: 30 PATCH | Refills: 0 | Status: SHIPPED | OUTPATIENT
Start: 2024-09-12

## 2025-01-23 DIAGNOSIS — D3A.098 BENIGN CARCINOID TUMORS OF OTHER SITES: ICD-10-CM

## 2025-01-23 RX ORDER — LIDOCAINE 50 MG/G
1 PATCH TOPICAL EVERY 12 HOURS
Qty: 30 PATCH | Refills: 0 | Status: SHIPPED | OUTPATIENT
Start: 2025-01-23

## 2025-02-14 DIAGNOSIS — D3A.098 BENIGN CARCINOID TUMORS OF OTHER SITES: ICD-10-CM

## 2025-02-14 RX ORDER — LIDOCAINE 50 MG/G
1 PATCH TOPICAL DAILY
Qty: 30 PATCH | Refills: 11 | Status: SHIPPED | OUTPATIENT
Start: 2025-02-14